# Patient Record
Sex: FEMALE | Race: WHITE | NOT HISPANIC OR LATINO | Employment: OTHER | URBAN - METROPOLITAN AREA
[De-identification: names, ages, dates, MRNs, and addresses within clinical notes are randomized per-mention and may not be internally consistent; named-entity substitution may affect disease eponyms.]

---

## 2018-01-09 NOTE — PATIENT DISCUSSION
Had a long discussion with pt.  He feels that he does not have enough symptoms yet to go ahead with surgery, although less able to follow the golf ball.  Monitor 1 year.

## 2018-01-09 NOTE — PATIENT DISCUSSION
Ed pt that his eyes tend to look more red due to this.  Also, Latanoprost can cause this side effect.

## 2019-12-11 ENCOUNTER — NEW PATIENT COMPREHENSIVE (OUTPATIENT)
Dept: URBAN - METROPOLITAN AREA CLINIC 32 | Facility: CLINIC | Age: 79
End: 2019-12-11

## 2019-12-11 DIAGNOSIS — H25.13: ICD-10-CM

## 2019-12-11 DIAGNOSIS — H52.03: ICD-10-CM

## 2019-12-11 PROCEDURE — 1036F TOBACCO NON-USER: CPT

## 2019-12-11 PROCEDURE — 92004 COMPRE OPH EXAM NEW PT 1/>: CPT

## 2019-12-11 PROCEDURE — G8482 FLU IMMUNIZE ORDER/ADMIN: HCPCS

## 2019-12-11 PROCEDURE — 4040F PNEUMOC VAC/ADMIN/RCVD: CPT

## 2019-12-11 PROCEDURE — G8785 BP SCRN NO PERF AT INTERVAL: HCPCS

## 2019-12-11 PROCEDURE — G8428 CUR MEDS NOT DOCUMENT: HCPCS

## 2019-12-11 PROCEDURE — 92015 DETERMINE REFRACTIVE STATE: CPT

## 2019-12-11 PROCEDURE — G9903 PT SCRN TBCO ID AS NON USER: HCPCS

## 2019-12-11 ASSESSMENT — KERATOMETRY
OS_K1POWER_DIOPTERS: 45.25
OD_AXISANGLE2_DEGREES: 170
OS_AXISANGLE_DEGREES: 95
OS_AXISANGLE2_DEGREES: 5
OD_K2POWER_DIOPTERS: 44.75
OD_AXISANGLE_DEGREES: 80
OS_K2POWER_DIOPTERS: 44.75
OD_K1POWER_DIOPTERS: 45.5

## 2019-12-11 ASSESSMENT — VISUAL ACUITY
OD_SC: 20/40+1
OS_PH: 20/30-2
OS_BAT: 20/70
OD_CC: J2
OS_CC: J2
OS_SC: 20/50-2
OD_BAT: 20/40

## 2019-12-11 ASSESSMENT — TONOMETRY
OD_IOP_MMHG: 17
OS_IOP_MMHG: 20

## 2020-03-23 NOTE — PATIENT DISCUSSION
Pt has D/C'd Latanoprost and IOP stabilized still in teens. Pressure today has remained adequate off meds.

## 2020-03-23 NOTE — PATIENT DISCUSSION
OCT ordered today showed some differences from last OCT, but different machine used. Overall appears stable. Last VF WNL OU. Monitor with comp June 2020.

## 2020-10-05 NOTE — PATIENT DISCUSSION
IOP stable. VF today shows WNL OD and Outside OS but defect similar to 2018 VF. Last 2019 VF was WNL. [facility] area carefully.

## 2021-04-05 NOTE — PATIENT DISCUSSION
04/05/2021 Today the VF was more difficult for pt. He noticed he was having trouble focusing on the target. The VF was suspicious for changes, but I feel they may be test to test fluctuation. RTC 3 months comp with OCT to compare. IOP is stable.

## 2021-04-05 NOTE — PATIENT DISCUSSION
IOP has been stable off meds since Istent.  VF was WNL OD and Outside OS but defect similar to 2018 VF. Last 2019 VF was WNL.

## 2021-04-05 NOTE — PATIENT DISCUSSION
OCT ordered today showed some differences from last OCT, but different machine used. Overall appears stable. Last VF WNL OU.

## 2022-02-28 ENCOUNTER — IMPORTED ENCOUNTER (OUTPATIENT)
Dept: URBAN - METROPOLITAN AREA CLINIC 31 | Facility: CLINIC | Age: 82
End: 2022-02-28

## 2022-02-28 PROBLEM — H04.123: Noted: 2022-02-28

## 2022-02-28 PROBLEM — H25.813: Noted: 2022-02-28

## 2022-02-28 PROBLEM — Z96.1: Noted: 2022-02-28

## 2022-02-28 PROCEDURE — 92015 DETERMINE REFRACTIVE STATE: CPT

## 2022-02-28 PROCEDURE — 99204 OFFICE O/P NEW MOD 45 MIN: CPT

## 2022-04-02 ASSESSMENT — TONOMETRY
OS_IOP_MMHG: 16
OD_IOP_MMHG: 16

## 2022-04-02 ASSESSMENT — VISUAL ACUITY
OD_PH: 20/50
OS_CC: 20/100
OS_GLARE: 20/100
OD_SC: 20/200
OD_GLARE: 20/100
OS_SC: 20/200
OS_PH: 20/50
OD_CC: 20/50

## 2022-05-05 ENCOUNTER — CONSULTATION/EVALUATION (OUTPATIENT)
Dept: URBAN - METROPOLITAN AREA CLINIC 32 | Facility: CLINIC | Age: 82
End: 2022-05-05

## 2022-05-05 DIAGNOSIS — D23.112: ICD-10-CM

## 2022-05-05 DIAGNOSIS — H25.13: ICD-10-CM

## 2022-05-05 DIAGNOSIS — L72.3: ICD-10-CM

## 2022-05-05 PROCEDURE — 92004 COMPRE OPH EXAM NEW PT 1/>: CPT

## 2022-05-05 ASSESSMENT — KERATOMETRY
OS_AXISANGLE2_DEGREES: 40
OS_AXISANGLE_DEGREES: 130
OD_AXISANGLE_DEGREES: 70
OD_K1POWER_DIOPTERS: 45.25
OD_AXISANGLE2_DEGREES: 160
OD_K2POWER_DIOPTERS: 44.75
OS_K2POWER_DIOPTERS: 44.50
OS_K1POWER_DIOPTERS: 45.00

## 2022-05-05 ASSESSMENT — VISUAL ACUITY
OD_PH: 20/40-1
OD_SC: J7
OD_SC: 20/50
OS_CC: J7
OS_SC: J16
OS_SC: 20/100
OD_CC: J3-2
OS_PH: 20/50

## 2022-05-05 ASSESSMENT — TONOMETRY
OS_IOP_MMHG: 15
OD_IOP_MMHG: 14

## 2022-05-06 ENCOUNTER — CLINIC PROCEDURE ONLY (OUTPATIENT)
Dept: URBAN - METROPOLITAN AREA CLINIC 32 | Facility: CLINIC | Age: 82
End: 2022-05-06

## 2022-05-06 DIAGNOSIS — L72.3: ICD-10-CM

## 2022-05-06 DIAGNOSIS — D23.112: ICD-10-CM

## 2022-05-06 PROCEDURE — 67810 INCAL BX EYELID SKN LID MRGN: CPT

## 2022-05-06 PROCEDURE — 67840 REMOVE EYELID LESION: CPT

## 2022-05-06 ASSESSMENT — KERATOMETRY
OD_AXISANGLE2_DEGREES: 160
OD_K1POWER_DIOPTERS: 45.25
OS_AXISANGLE2_DEGREES: 40
OD_AXISANGLE_DEGREES: 70
OS_K2POWER_DIOPTERS: 44.50
OS_AXISANGLE_DEGREES: 130
OS_K1POWER_DIOPTERS: 45.00
OD_K2POWER_DIOPTERS: 44.75

## 2022-06-04 ENCOUNTER — TELEPHONE ENCOUNTER (OUTPATIENT)
Dept: URBAN - METROPOLITAN AREA CLINIC 68 | Facility: CLINIC | Age: 82
End: 2022-06-04

## 2022-06-05 ENCOUNTER — TELEPHONE ENCOUNTER (OUTPATIENT)
Dept: URBAN - METROPOLITAN AREA CLINIC 68 | Facility: CLINIC | Age: 82
End: 2022-06-05

## 2022-06-05 RX ORDER — ATORVASTATIN CALCIUM 80 MG/1
LIPITOR( 80MG ORAL  DAILY ) ACTIVE -HX ENTRY TABLET, FILM COATED ORAL DAILY
Status: ACTIVE | COMMUNITY
Start: 2014-02-28

## 2022-06-05 RX ORDER — ALPRAZOLAM 0.25 MG
XANAX( 0.25MG ORAL  PRN ) ACTIVE -HX ENTRY TABLET ORAL PRN
Status: ACTIVE | COMMUNITY
Start: 2014-02-28

## 2022-06-05 RX ORDER — PANTOPRAZOLE SODIUM 40 MG/1
TABLET, DELAYED RELEASE ORAL
Qty: 30 | Refills: 30 | Status: ACTIVE | COMMUNITY
Start: 2014-02-28

## 2022-06-05 RX ORDER — ZOLPIDEM TARTRATE 5 MG/1
ZOLPIDEM TARTRATE( 5MG ORAL  DAILY ) ACTIVE -HX ENTRY TABLET, FILM COATED ORAL DAILY
Status: ACTIVE | COMMUNITY
Start: 2014-02-28

## 2022-06-05 RX ORDER — DEXLANSOPRAZOLE 60 MG/1
CAPSULE, DELAYED RELEASE ORAL DAILY
Qty: 90 | Refills: 90 | Status: ACTIVE | COMMUNITY
Start: 2013-07-31

## 2022-06-25 ENCOUNTER — TELEPHONE ENCOUNTER (OUTPATIENT)
Age: 82
End: 2022-06-25

## 2022-06-25 RX ORDER — SODIUM SULFATE, POTASSIUM SULFATE, MAGNESIUM SULFATE 17.5; 3.13; 1.6 G/ML; G/ML; G/ML
SOLUTION, CONCENTRATE ORAL AS DIRECTED
Qty: 1 | Refills: 0 | OUTPATIENT
Start: 2013-05-30 | End: 2013-05-31

## 2022-06-25 RX ORDER — ESTRADIOL MICRONIZED 100 %
ESTRADIOL(   .25 DAILY ) INACTIVE -HX ENTRY POWDER (GRAM) MISCELLANEOUS DAILY
OUTPATIENT
Start: 2014-02-28

## 2022-06-26 ENCOUNTER — TELEPHONE ENCOUNTER (OUTPATIENT)
Age: 82
End: 2022-06-26

## 2022-06-26 RX ORDER — DEXLANSOPRAZOLE 60 MG/1
CAPSULE, DELAYED RELEASE ORAL DAILY
Qty: 90 | Refills: 90 | Status: ACTIVE | COMMUNITY
Start: 2013-07-31

## 2022-06-26 RX ORDER — ALPRAZOLAM 0.25 MG
XANAX( 0.25MG ORAL  PRN ) ACTIVE -HX ENTRY TABLET ORAL PRN
Status: ACTIVE | COMMUNITY
Start: 2014-02-28

## 2022-06-26 RX ORDER — ZOLPIDEM TARTRATE 5 MG/1
ZOLPIDEM TARTRATE( 5MG ORAL  DAILY ) ACTIVE -HX ENTRY TABLET ORAL DAILY
Status: ACTIVE | COMMUNITY
Start: 2014-02-28

## 2022-06-26 RX ORDER — ATORVASTATIN CALCIUM 80 MG/1
LIPITOR( 80MG ORAL  DAILY ) ACTIVE -HX ENTRY TABLET, FILM COATED ORAL DAILY
Status: ACTIVE | COMMUNITY
Start: 2014-02-28

## 2022-06-26 RX ORDER — PANTOPRAZOLE 40 MG/1
TABLET, DELAYED RELEASE ORAL
Qty: 30 | Refills: 30 | Status: ACTIVE | COMMUNITY
Start: 2014-02-28

## 2023-10-23 NOTE — PATIENT DISCUSSION
Pt will consider surgery in the near future. Discussed DVO custom. Otherwise RTC 6 months IOP as in #1. no